# Patient Record
Sex: FEMALE | URBAN - METROPOLITAN AREA
[De-identification: names, ages, dates, MRNs, and addresses within clinical notes are randomized per-mention and may not be internally consistent; named-entity substitution may affect disease eponyms.]

---

## 2024-10-20 ENCOUNTER — OFFICE VISIT (OUTPATIENT)
Dept: URGENT CARE | Age: 39
End: 2024-10-20
Payer: COMMERCIAL

## 2024-10-20 VITALS
WEIGHT: 138 LBS | DIASTOLIC BLOOD PRESSURE: 77 MMHG | SYSTOLIC BLOOD PRESSURE: 112 MMHG | BODY MASS INDEX: 24.45 KG/M2 | HEIGHT: 63 IN | TEMPERATURE: 97.5 F | OXYGEN SATURATION: 96 % | RESPIRATION RATE: 18 BRPM | HEART RATE: 80 BPM

## 2024-10-20 DIAGNOSIS — L01.00 IMPETIGO ANY SITE: Primary | ICD-10-CM

## 2024-10-20 DIAGNOSIS — B96.89 LOCALIZED BACTERIAL SKIN INFECTION: ICD-10-CM

## 2024-10-20 DIAGNOSIS — L08.9 LOCALIZED BACTERIAL SKIN INFECTION: ICD-10-CM

## 2024-10-20 RX ORDER — BUPROPION HYDROCHLORIDE 150 MG/1
150 TABLET ORAL DAILY
COMMUNITY

## 2024-10-20 RX ORDER — MUPIROCIN 20 MG/G
OINTMENT TOPICAL
Qty: 22 G | Refills: 0 | Status: SHIPPED | OUTPATIENT
Start: 2024-10-20

## 2024-10-20 ASSESSMENT — ENCOUNTER SYMPTOMS
ENDOCRINE NEGATIVE: 1
NEUROLOGICAL NEGATIVE: 1
EYES NEGATIVE: 1
CARDIOVASCULAR NEGATIVE: 1
HEMATOLOGIC/LYMPHATIC NEGATIVE: 1
MUSCULOSKELETAL NEGATIVE: 1
CONSTITUTIONAL NEGATIVE: 1
PSYCHIATRIC NEGATIVE: 1
ALLERGIC/IMMUNOLOGIC NEGATIVE: 1
GASTROINTESTINAL NEGATIVE: 1
RESPIRATORY NEGATIVE: 1

## 2024-10-20 ASSESSMENT — PAIN SCALES - GENERAL: PAINLEVEL_OUTOF10: 6

## 2024-10-20 NOTE — PROGRESS NOTES
"Subjective   Patient ID: Sharon Redding is a 39 y.o. female. They present today with a chief complaint of Other (Tip of nose pain x 5 days no trauma).    History of Present Illness  Patient is a 40 y/o female c/o bilateral nares discomfort x5 days. Patient reports tenderness is inside nose; denies recent trauma/injury, nasal bleeding/discharge, fever, chills, bodyaches, lethargy, weakness, chest pain/tightness/pressure, SOB, wheezing, N/V/D, ABD pain. No OTC medication reported to be taken.           Past Medical History  Allergies as of 10/20/2024    (No Known Allergies)       (Not in a hospital admission)       No past medical history on file.    No past surgical history on file.         Review of Systems  Review of Systems   Constitutional: Negative.    HENT:          Tenderness to nares   Eyes: Negative.    Respiratory: Negative.     Cardiovascular: Negative.    Gastrointestinal: Negative.    Endocrine: Negative.    Genitourinary: Negative.    Musculoskeletal: Negative.    Skin: Negative.    Allergic/Immunologic: Negative.    Neurological: Negative.    Hematological: Negative.    Psychiatric/Behavioral: Negative.                                    Objective    Vitals:    10/20/24 1643   BP: 112/77   BP Location: Right arm   Patient Position: Sitting   BP Cuff Size: Small adult   Pulse: 80   Resp: 18   Temp: 36.4 °C (97.5 °F)   TempSrc: Skin   SpO2: 96%   Weight: 62.6 kg (138 lb)   Height: 1.6 m (5' 3\")     No LMP recorded (lmp unknown). (Menstrual status: IUD).    Physical Exam  Constitutional:       Comments: Patient A/O x4, LOC 5, calm and cooperative. Patient self-ambulatory to treatment area and is in no acute distress.    HENT:      Head: Normocephalic and atraumatic.      Right Ear: Tympanic membrane normal.      Left Ear: Tympanic membrane normal.      Nose:      Comments: Bilateral nasal mucosa with irregularly shaped, raised honey-colored crusting lesions present. No open tissue or active exudates. No " discharge from nares bilaterally. No edema or erythema to bilateral inferior turbinates. All other visible tissue intact     Mouth/Throat:      Mouth: Mucous membranes are dry.   Eyes:      Extraocular Movements: Extraocular movements intact.      Conjunctiva/sclera: Conjunctivae normal.      Pupils: Pupils are equal, round, and reactive to light.   Cardiovascular:      Rate and Rhythm: Normal rate and regular rhythm.      Pulses: Normal pulses.      Heart sounds: Normal heart sounds.   Pulmonary:      Effort: Pulmonary effort is normal.      Breath sounds: Normal breath sounds.   Abdominal:      General: Abdomen is flat.      Palpations: Abdomen is soft.   Musculoskeletal:         General: Normal range of motion.      Cervical back: Neck supple.   Skin:     General: Skin is warm and dry.      Capillary Refill: Capillary refill takes less than 2 seconds.   Neurological:      General: No focal deficit present.      Mental Status: She is oriented to person, place, and time.   Psychiatric:         Mood and Affect: Mood normal.         Behavior: Behavior normal.         Procedures    Point of Care Test & Imaging Results from this visit  No results found for this visit on 10/20/24.   No results found.    Diagnostic study results (if any) were reviewed by AMAYA Navarrete.    Assessment/Plan   Allergies, medications, history, and pertinent labs/EKGs/Imaging reviewed by AMAYA Navarrete.     Medical Decision Making  Patient presenting with impetigo to bilateral nasal mucosa. Education provided regarding Mupirocin application and when to follow-up with PCP. At time of discharge, patient was clinically well-appearing and appropriate for outpatient management. The patient/parent/guardian was educated regarding diagnosis, supportive care, OTC and Rx medications. The patient/parent/guardian was given the opportunity to ask questions prior to discharge. They verbalized understanding of discussion of treatment plan,  expected course of illness and/or injury, indications on when to return to , when to seek further evaluation in ED/call 911, and the need to follow up with PCP and/or specialist as referred. Patient/parent/guardian was provided with work/school documentation if requested. Patient stable upon discharge.     Orders and Diagnoses  Diagnoses and all orders for this visit:  Impetigo any site  Localized bacterial skin infection  -     mupirocin (Bactroban) 2 % ointment; After gently cleansing and drying area, apply thin film topically to affected areas 3 times daily x7-10 days.      Medical Admin Record      Patient disposition: Home    Electronically signed by AMAYA Navarrete  5:12 PM

## 2025-02-18 ENCOUNTER — OFFICE VISIT (OUTPATIENT)
Dept: URGENT CARE | Age: 40
End: 2025-02-18
Payer: COMMERCIAL

## 2025-02-18 VITALS
OXYGEN SATURATION: 98 % | RESPIRATION RATE: 12 BRPM | SYSTOLIC BLOOD PRESSURE: 97 MMHG | DIASTOLIC BLOOD PRESSURE: 65 MMHG | HEART RATE: 72 BPM | TEMPERATURE: 97.7 F

## 2025-02-18 DIAGNOSIS — R11.0 NAUSEA: ICD-10-CM

## 2025-02-18 DIAGNOSIS — R30.0 DYSURIA: Primary | ICD-10-CM

## 2025-02-18 DIAGNOSIS — Z97.5 IUD (INTRAUTERINE DEVICE) IN PLACE: ICD-10-CM

## 2025-02-18 DIAGNOSIS — R35.0 URINARY FREQUENCY: ICD-10-CM

## 2025-02-18 DIAGNOSIS — R39.89 SENSATION OF PRESSURE IN BLADDER AREA: ICD-10-CM

## 2025-02-18 LAB
POC APPEARANCE, URINE: CLEAR
POC BILIRUBIN, URINE: NEGATIVE
POC BLOOD, URINE: NEGATIVE
POC COLOR, URINE: YELLOW
POC GLUCOSE, URINE: NEGATIVE MG/DL
POC KETONES, URINE: NEGATIVE MG/DL
POC LEUKOCYTES, URINE: NEGATIVE
POC NITRITE,URINE: NEGATIVE
POC PH, URINE: 6.5 PH
POC PROTEIN, URINE: NEGATIVE MG/DL
POC SPECIFIC GRAVITY, URINE: 1.01
POC UROBILINOGEN, URINE: 0.2 EU/DL
PREGNANCY TEST URINE, POC: NEGATIVE

## 2025-02-18 RX ORDER — NITROFURANTOIN 25; 75 MG/1; MG/1
100 CAPSULE ORAL 2 TIMES DAILY
Qty: 10 CAPSULE | Refills: 0 | Status: SHIPPED | OUTPATIENT
Start: 2025-02-18 | End: 2025-02-23

## 2025-02-18 RX ORDER — PHENAZOPYRIDINE HYDROCHLORIDE 200 MG/1
200 TABLET, FILM COATED ORAL 3 TIMES DAILY PRN
Qty: 10 TABLET | Refills: 0 | Status: SHIPPED | OUTPATIENT
Start: 2025-02-18 | End: 2025-02-21

## 2025-02-18 ASSESSMENT — ENCOUNTER SYMPTOMS
RESPIRATORY NEGATIVE: 1
EYES NEGATIVE: 1
PSYCHIATRIC NEGATIVE: 1
NEUROLOGICAL NEGATIVE: 1
CARDIOVASCULAR NEGATIVE: 1
CONSTITUTIONAL NEGATIVE: 1
MUSCULOSKELETAL NEGATIVE: 1
NAUSEA: 1
DIFFICULTY URINATING: 1
FREQUENCY: 1
DYSURIA: 1

## 2025-02-18 ASSESSMENT — PAIN SCALES - GENERAL: PAINLEVEL_OUTOF10: 2

## 2025-02-18 NOTE — PROGRESS NOTES
Subjective   Patient ID: Sharon Redding is a 39 y.o. female. They present today with a chief complaint of Urinary Frequency (X2 days ) and Female Dysuria (X2 days ).    History of Present Illness  C/o nausea, bladder pressure, dysuria, frequency s/s x 2 day(s). History of UTI. States feels like her last one from a year ago. IUD in place. Mild concern for pregnancy but has had neg tests at home. Has tried OTC meds with mild relief. Patient denies any CP, SOB, HA, fever, abdominal pain, and vomiting otherwise.      History provided by:  Patient  Urinary Frequency  Associated symptoms: nausea    Female Dysuria  Associated symptoms: nausea        Past Medical History  Allergies as of 02/18/2025    (No Known Allergies)       (Not in a hospital admission)       History reviewed. No pertinent past medical history.    History reviewed. No pertinent surgical history.     reports that she has never smoked. She has never been exposed to tobacco smoke. She has never used smokeless tobacco. She reports current alcohol use. She reports that she does not use drugs.    Review of Systems  Review of Systems   Constitutional: Negative.    HENT: Negative.     Eyes: Negative.    Respiratory: Negative.     Cardiovascular: Negative.    Gastrointestinal:  Positive for nausea.   Genitourinary:  Positive for difficulty urinating, dysuria, frequency and urgency. Negative for vaginal bleeding, vaginal discharge and vaginal pain.   Musculoskeletal: Negative.    Skin: Negative.    Neurological: Negative.    Psychiatric/Behavioral: Negative.                                    Objective    Vitals:    02/18/25 1557   BP: 97/65   BP Location: Left arm   Patient Position: Sitting   BP Cuff Size: Adult   Pulse: 72   Resp: 12   Temp: 36.5 °C (97.7 °F)   TempSrc: Temporal   SpO2: 98%     No LMP recorded. (Menstrual status: IUD).    Physical Exam  Vitals and nursing note reviewed.   Constitutional:       General: She is not in acute distress.      Appearance: Normal appearance. She is normal weight. She is not ill-appearing, toxic-appearing or diaphoretic.   HENT:      Head: Normocephalic and atraumatic.      Right Ear: Tympanic membrane and ear canal normal.      Left Ear: Tympanic membrane and ear canal normal.      Nose: Nose normal.      Mouth/Throat:      Mouth: Mucous membranes are moist.      Pharynx: Oropharynx is clear.   Eyes:      Extraocular Movements: Extraocular movements intact.      Pupils: Pupils are equal, round, and reactive to light.   Cardiovascular:      Rate and Rhythm: Normal rate and regular rhythm.      Pulses: Normal pulses.      Heart sounds: Normal heart sounds.   Pulmonary:      Effort: Pulmonary effort is normal. No respiratory distress.      Breath sounds: Normal breath sounds. No wheezing or rhonchi.   Abdominal:      General: Abdomen is flat. Bowel sounds are normal.      Palpations: Abdomen is soft.      Tenderness: There is no abdominal tenderness. There is no right CVA tenderness, left CVA tenderness, guarding or rebound.   Skin:     General: Skin is warm and dry.   Neurological:      Mental Status: She is alert and oriented to person, place, and time.   Psychiatric:         Mood and Affect: Mood normal.         Behavior: Behavior normal.         Procedures    Point of Care Test & Imaging Results from this visit  Results for orders placed or performed in visit on 02/18/25   POCT UA Automated manually resulted   Result Value Ref Range    POC Color, Urine Yellow Straw, Yellow, Light-Yellow    POC Appearance, Urine Clear Clear    POC Glucose, Urine NEGATIVE NEGATIVE mg/dl    POC Bilirubin, Urine NEGATIVE NEGATIVE    POC Ketones, Urine NEGATIVE NEGATIVE mg/dl    POC Specific Gravity, Urine 1.015 1.005 - 1.035    POC Blood, Urine NEGATIVE NEGATIVE    POC PH, Urine 6.5 No Reference Range Established PH    POC Protein, Urine NEGATIVE NEGATIVE mg/dl    POC Urobilinogen, Urine 0.2 0.2, 1.0 EU/DL    Poc Nitrite, Urine NEGATIVE  NEGATIVE    POC Leukocytes, Urine NEGATIVE NEGATIVE   POCT pregnancy, urine manually resulted   Result Value Ref Range    Preg Test, Ur Negative Negative      No results found.    Diagnostic study results (if any) were reviewed by AMAYA Ozuna.    Assessment/Plan   Allergies, medications, history, and pertinent labs/EKGs/Imaging reviewed by AMAYA Ozuna.     Medical Decision Making  UA neg for UTI. D/t history and complaint, will send macrobid and pyridium. Sending culture and will adjust treatment PRN. Preg neg. Discussed pushing fluids, rest, OTC symptoms management PRN. Patient verbalized understanding and agreed with the plan of care.      At time of discharge, patient was clinically well-appearing and appropriate for outpatient management. The patient/parent/guardian was educated regarding diagnosis, supportive care, OTC and Rx medications. The patient/parent/guardian was given the opportunity to ask questions prior to discharge. They verbalized understanding of discussion of treatment plan, expected course of illness and/or injury, indications on when to return to , when to seek further evaluation in ED/call 911, and the need to follow up with PCP and/or specialist as referred. Patient/parent/guardian was provided with work/school documentation if requested. Patient stable upon discharge.      Orders and Diagnoses  Diagnoses and all orders for this visit:  Dysuria  -     Urine Culture  -     phenazopyridine (Pyridium) 200 mg tablet; Take 1 tablet (200 mg) by mouth 3 times a day as needed for bladder spasms for up to 3 days.  -     nitrofurantoin, macrocrystal-monohydrate, (Macrobid) 100 mg capsule; Take 1 capsule (100 mg) by mouth 2 times a day for 5 days.  Urinary frequency  -     POCT UA Automated manually resulted  -     Urine Culture  -     nitrofurantoin, macrocrystal-monohydrate, (Macrobid) 100 mg capsule; Take 1 capsule (100 mg) by mouth 2 times a day for 5 days.  Sensation  of pressure in bladder area  -     phenazopyridine (Pyridium) 200 mg tablet; Take 1 tablet (200 mg) by mouth 3 times a day as needed for bladder spasms for up to 3 days.  -     nitrofurantoin, macrocrystal-monohydrate, (Macrobid) 100 mg capsule; Take 1 capsule (100 mg) by mouth 2 times a day for 5 days.  Nausea  -     POCT pregnancy, urine manually resulted  IUD (intrauterine device) in place      Medical Admin Record      Patient disposition: Home    Electronically signed by AMAYA Ozuna  4:45 PM

## 2025-02-20 LAB — BACTERIA UR CULT: NORMAL
